# Patient Record
Sex: FEMALE | Race: WHITE | NOT HISPANIC OR LATINO | Employment: FULL TIME | URBAN - METROPOLITAN AREA
[De-identification: names, ages, dates, MRNs, and addresses within clinical notes are randomized per-mention and may not be internally consistent; named-entity substitution may affect disease eponyms.]

---

## 2022-11-03 ENCOUNTER — OFFICE VISIT (OUTPATIENT)
Dept: FAMILY MEDICINE | Facility: CLINIC | Age: 57
End: 2022-11-03
Payer: COMMERCIAL

## 2022-11-03 ENCOUNTER — ANCILLARY PROCEDURE (OUTPATIENT)
Dept: GENERAL RADIOLOGY | Facility: CLINIC | Age: 57
End: 2022-11-03
Attending: FAMILY MEDICINE
Payer: COMMERCIAL

## 2022-11-03 VITALS
HEIGHT: 66 IN | RESPIRATION RATE: 16 BRPM | HEART RATE: 75 BPM | BODY MASS INDEX: 28 KG/M2 | DIASTOLIC BLOOD PRESSURE: 80 MMHG | SYSTOLIC BLOOD PRESSURE: 118 MMHG | WEIGHT: 174.2 LBS | OXYGEN SATURATION: 96 %

## 2022-11-03 DIAGNOSIS — G89.29 CHRONIC LEFT SHOULDER PAIN: ICD-10-CM

## 2022-11-03 DIAGNOSIS — M25.512 CHRONIC LEFT SHOULDER PAIN: Primary | ICD-10-CM

## 2022-11-03 DIAGNOSIS — M75.02 ADHESIVE CAPSULITIS OF LEFT SHOULDER: ICD-10-CM

## 2022-11-03 DIAGNOSIS — M25.512 CHRONIC LEFT SHOULDER PAIN: ICD-10-CM

## 2022-11-03 DIAGNOSIS — G89.29 CHRONIC LEFT SHOULDER PAIN: Primary | ICD-10-CM

## 2022-11-03 PROCEDURE — 73030 X-RAY EXAM OF SHOULDER: CPT | Mod: TC | Performed by: RADIOLOGY

## 2022-11-03 PROCEDURE — 99203 OFFICE O/P NEW LOW 30 MIN: CPT | Performed by: FAMILY MEDICINE

## 2022-11-03 NOTE — PROGRESS NOTES
"  Assessment & Plan     (M25.512,  G89.29) Chronic left shoulder pain  (primary encounter diagnosis)  Comment: pt has left shoulder decrease ROM with mild tenderness when sleep on left side for several month. She had the same problem in the past and she did yoga  and recovered. Several month ago she over reached her dog and over stretched. Denies shooting pain and numbness and tingling to arm. Exam: left shoulder mild decreased ROM and pain on motion. No tenderness and deformity. Negative  Impingement syndrome, normal  sensation and strength. X ray is negative.   Likely she has adhesive capsulitis of left shoulder.   Plan: XR Shoulder Left G/E 3 Views, Physical Therapy         Referral        Advise rest and avoid heavy lifting. rom exercise addressed. Avoid sleep on left shoulder. Ice prn. Will have physical therapy. If symptoms worse will need shoulder mri.     (M75.02) Adhesive capsulitis of left shoulder  Comment: pt has left shoulder decreased ROM. She has adhesive capsulitis.   Plan: advise ROM exercise as tolerated. Ice and avoid heavy lifting.    956}     BMI:   Estimated body mass index is 28.12 kg/m  as calculated from the following:    Height as of this encounter: 1.676 m (5' 6\").    Weight as of this encounter: 79 kg (174 lb 3.2 oz).   Weight management plan: Discussed healthy diet and exercise guidelines        Return if symptoms worsen or fail to improve.    Elizabeth Fu MD  [facility] SISI Amado is a 56 year old, presenting for the following health issues:  Musculoskeletal Problem (Shoulders. Left shoulder been a problem X4 years. )      Musculoskeletal Problem    History of Present Illness       Reason for visit:  Shoulder injury limited range of motion  Symptom onset:  More than a month  Symptoms include:  Pain limited range of motion  Symptom intensity:  Moderate  Symptom progression:  Worsening  Had these symptoms before:  Yes  Has tried/received treatment for " "these symptoms:  No  What makes it worse:  Raising my arm or reaching behind my back  What makes it better:  No    She eats 4 or more servings of fruits and vegetables daily.She consumes 0 sweetened beverage(s) daily.She exercises with enough effort to increase her heart rate 10 to 19 minutes per day.  She exercises with enough effort to increase her heart rate 3 or less days per week.   She is taking medications regularly.          Review of Systems   Constitutional, HEENT, cardiovascular, pulmonary, gi and gu systems are negative, except as otherwise noted.      Objective    /80 (BP Location: Left arm, Patient Position: Sitting, Cuff Size: Adult Regular)   Pulse 75   Resp 16   Ht 1.676 m (5' 6\")   Wt 79 kg (174 lb 3.2 oz)   SpO2 96%   BMI 28.12 kg/m    Body mass index is 28.12 kg/m .  Physical Exam   GENERAL: healthy, alert and no distress  Shoulder exam -  Right  full range of motion, no pain on motion, no tenderness or deformity noted. Left mild decreased ROM . Moderation pain on motion. No tenderness and deformity. Normal strength, reflex and sensation of arm. Negative impiment   Cervical, thoracic and lumbar spine exam is normal without tenderness, masses or kyphoscoliosis. Full range of motion without pain is noted.                  "

## 2022-12-05 ENCOUNTER — HOSPITAL ENCOUNTER (OUTPATIENT)
Dept: PHYSICAL THERAPY | Facility: REHABILITATION | Age: 57
Discharge: HOME | End: 2022-12-05
Attending: FAMILY MEDICINE
Payer: COMMERCIAL

## 2022-12-05 DIAGNOSIS — M25.512 CHRONIC LEFT SHOULDER PAIN: ICD-10-CM

## 2022-12-05 DIAGNOSIS — M62.81 MUSCLE WEAKNESS (GENERALIZED): Primary | ICD-10-CM

## 2022-12-05 DIAGNOSIS — M25.612 DECREASED RANGE OF MOTION OF LEFT SHOULDER: ICD-10-CM

## 2022-12-05 DIAGNOSIS — G89.29 CHRONIC LEFT SHOULDER PAIN: ICD-10-CM

## 2022-12-05 PROCEDURE — 97110 THERAPEUTIC EXERCISES: CPT | Mod: GP | Performed by: PHYSICAL THERAPIST

## 2022-12-05 PROCEDURE — 97161 PT EVAL LOW COMPLEX 20 MIN: CPT | Mod: GP | Performed by: PHYSICAL THERAPIST

## 2022-12-05 NOTE — DISCHARGE INSTRUCTIONS
Exercises       X10-20 reps 1-2 sets 1-2x/day If this gets easy without fatigue you can add 1 lb or soup can      X10-20 reps 1-2 sets 1-2x/day - try to keep arms just to chest height or even lower so this is comfortable to perform      [place] and arrow stretch x5-10 reps - CAN do laying on your side or up against a wall    YOGA   Start with   Cat/cow x10-20 reps     Leaning mid range forward backward in 4 pt point (hands and knees)    Gently pushing up to downward dog and bicycling your ankles     Can work on moving through downward dog back down to hands and knees

## 2022-12-07 NOTE — ADDENDUM NOTE
Encounter addended by: Tiffanie Sawyer PT on: 12/6/2022 10:43 PM   Actions taken: Charge Capture section accepted

## 2022-12-08 NOTE — ADDENDUM NOTE
Encounter addended by: Tiffanie Sawyer, PT on: 12/8/2022 2:13 PM   Actions taken: Clinical Note Signed, Flowsheet accepted

## 2022-12-08 NOTE — PROGRESS NOTES
12/05/22 1600   General Information   Type of Visit Initial OP Ortho PT Evaluation   Start of Care Date 12/05/22   Referring Physician Elizabeth Fu MD   Patient/Family Goals Statement frozen shoulder   Orders Evaluate and Treat   Date of Order 11/03/22   Certification Required? No   Medical Diagnosis Chronic left shoulder pain (M25.512, G89.29)   Surgical/Medical history reviewed Yes   Precautions/Limitations no known precautions/limitations   General Information Comments Pt reports injuring her L collar bone from falling off of a horse years ago.   Body Part(s)   Body Part(s) Shoulder   Presentation and Etiology   Pertinent history of current problem (include personal factors and/or comorbidities that impact the POC) Pt reports having L shoulder sx for a couple of years and she was able to improve stiffness and pain pretty well with yoga for a couple of years but then about 6 months ago pt got COVID and long-haul COVID sx where she had to stop yoga due to those sx. Pt reports that she tried to return to yoga 3-4 times but she had increased pain from that so she stopped and then she had to reach and lunge for her dog at the vet and that pulled and was painful on her shoulder as well. Pt had been trying to rest it and do anti-inflammatories but that didn't help much. Pt then started doing some codmans exercises and that has felt helpful. Pt remains very limited with how well she can move her arm out to the side, overhead and behind her, is very frustrated that she can't perform yoga and perform ADLs with L UE. Pt also has increased pain with sleeping on L side disrupting her sleep quality.   Impairments A. Pain;D. Decreased ROM;E. Decreased flexibility;F. Decreased strength and endurance   Functional Limitations perform activities of daily living;perform desired leisure / sports activities   Symptom Location L shoulder   How/Where did it occur From insidious onset   Onset date of current episode/exacerbation  06/08/22   Chronicity Chronic   Pain rating (0-10 point scale) Other   Pain rating comment 0-10/10   Pain quality A. Sharp;C. Aching   Frequency of pain/symptoms C. With activity   Pain/symptoms exacerbated by G. Certain positions;H. Overhead reach;J. ADL;K. Home tasks   Progression of symptoms since onset: Improved   Fall Risk Screen   Fall screen completed by PT   Have you fallen 2 or more times in the past year? No   Have you fallen and had an injury in the past year? No   Is patient a fall risk? No   Abuse Screen (yes response referral indicated)   Feels Unsafe at Home or Work/School no   Feels Threatened by Someone no   Does Anyone Try to Keep You From Having Contact with Others or Doing Things Outside Your Home? no   Physical Signs of Abuse Present no   Functional Scales   Functional Scales Other   Other Scales  SPADI 71/130   Shoulder Objective Findings   Side (if bilateral, select both right and left) Left   Shoulder ROM Comment R shoulder ROM WNL with IR T5 with tightness   Mcneill-Konstantni Test Positive L   Shoulder Special Tests Comments Positive painful arc L   Left Shoulder Flexion AROM L flex 130 degrees with pain end range   Left Shoulder Abduction AROM L scap plane abd 148 degrees with pain end range   Left Shoulder ER AROM L ER 52 degrees with stiffness and pain   Left Shoulder IR AROM L1 with stiffness and pain   Left Shoulder Flexion Strength L 4/5 with pain   Left Shoulder Abduction Strength L 3+/5 with pain   Left Shoulder ER Strength L 4/5 without pain   Left Shoulder IR Strength L 5/5 without pain   Clinical Impression   Criteria for Skilled Therapeutic Interventions Met yes, treatment indicated   PT Diagnosis Chronic left shoulder pain with decreased ROM and strength   Clinical Presentation Stable/Uncomplicated   Clinical Decision Making (Complexity) Low complexity   Therapy Frequency 1 time/week   Predicted Duration of Therapy Intervention (days/wks) 12 weeks   Risk & Benefits of therapy have  been explained Yes   Patient, Family & other staff in agreement with plan of care Yes   Clinical Impression Comments Pt demo's signs and sx consistent with chronic L shoulder pain with positive impingement signs and limited L shoulder ROM in non-capsular pattern and rotator cuff and shoulder girdle weakness. Pt is good candidate for skilled PT services to address impairments and reach goals.   ORTHO GOALS   PT Ortho Eval Goals 1;2;3   Ortho Goal 1   Goal Description Pt will be able to sleep on L side without awakening or having sleep disrupted without L shoulder pain for improved quality of sleep in 12 weeks.   Target Date 02/26/23   Ortho Goal 2   Goal Description Pt will be able to perform yoga without L shoulder pain or difficulty for improved QOL in 12 weeks.   Target Date 02/26/23   Ortho Goal 3   Goal Description Pt will be able to use L arm for ADLs and home renovations without L shoulder pain or difficulty for improved ability to perform these tasks in 12 weeks.   Target Date 02/26/23   Total Evaluation Time   PT Mindy Low Complexity Minutes ([id number]) 26

## 2022-12-10 ENCOUNTER — HEALTH MAINTENANCE LETTER (OUTPATIENT)
Age: 57
End: 2022-12-10

## 2023-01-23 ENCOUNTER — HOSPITAL ENCOUNTER (OUTPATIENT)
Dept: PHYSICAL THERAPY | Facility: REHABILITATION | Age: 58
Discharge: HOME | End: 2023-01-23
Payer: COMMERCIAL

## 2023-01-23 DIAGNOSIS — M62.81 MUSCLE WEAKNESS (GENERALIZED): ICD-10-CM

## 2023-01-23 DIAGNOSIS — G89.29 CHRONIC LEFT SHOULDER PAIN: Primary | ICD-10-CM

## 2023-01-23 DIAGNOSIS — M25.612 DECREASED RANGE OF MOTION OF LEFT SHOULDER: ICD-10-CM

## 2023-01-23 DIAGNOSIS — M25.512 CHRONIC LEFT SHOULDER PAIN: Primary | ICD-10-CM

## 2023-01-23 PROCEDURE — 97110 THERAPEUTIC EXERCISES: CPT | Mod: GP | Performed by: PHYSICAL THERAPIST

## 2023-01-23 NOTE — DISCHARGE INSTRUCTIONS
Change laying on your side to standing band rotation     X10-20 reps 1-2 sets 1-2x/day    ADD      X10-20 reps 1-2 sets 1-2x/day    Can switch to 2 lbs with standing diagonal raises when 1 lb is easy x20 reps    ADD counter push up    X5-10 reps 1-2x/day

## 2024-01-14 ENCOUNTER — HEALTH MAINTENANCE LETTER (OUTPATIENT)
Age: 59
End: 2024-01-14

## 2024-12-22 ENCOUNTER — HEALTH MAINTENANCE LETTER (OUTPATIENT)
Age: 59
End: 2024-12-22

## 2025-01-26 ENCOUNTER — HEALTH MAINTENANCE LETTER (OUTPATIENT)
Age: 60
End: 2025-01-26

## 2025-07-06 ENCOUNTER — OFFICE VISIT (OUTPATIENT)
Dept: URGENT CARE | Facility: URGENT CARE | Age: 60
End: 2025-07-06
Payer: COMMERCIAL

## 2025-07-06 VITALS
BODY MASS INDEX: 27.92 KG/M2 | WEIGHT: 173 LBS | OXYGEN SATURATION: 97 % | SYSTOLIC BLOOD PRESSURE: 135 MMHG | RESPIRATION RATE: 16 BRPM | HEART RATE: 96 BPM | DIASTOLIC BLOOD PRESSURE: 82 MMHG | TEMPERATURE: 98.7 F
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
## 2025-07-06 DIAGNOSIS — S91.059A CAT BITE OF ANKLE, INITIAL ENCOUNTER: Primary | ICD-10-CM

## 2025-07-06 PROCEDURE — 90471 IMMUNIZATION ADMIN: CPT | Performed by: FAMILY MEDICINE

## 2025-07-06 PROCEDURE — 90715 TDAP VACCINE 7 YRS/> IM: CPT | Performed by: FAMILY MEDICINE

## 2025-07-06 PROCEDURE — 3075F SYST BP GE 130 - 139MM HG: CPT | Performed by: FAMILY MEDICINE

## 2025-07-06 PROCEDURE — 3079F DIAST BP 80-89 MM HG: CPT | Performed by: FAMILY MEDICINE

## 2025-07-06 PROCEDURE — 99213 OFFICE O/P EST LOW 20 MIN: CPT | Mod: 25 | Performed by: FAMILY MEDICINE

## 2025-07-06 RX ORDER — SULFAMETHOXAZOLE AND TRIMETHOPRIM 800; 160 MG/1; MG/1
1 TABLET ORAL 2 TIMES DAILY
Qty: 20 TABLET | Refills: 0 | Status: SHIPPED | OUTPATIENT
Start: 2025-07-06 | End: 2025-07-16

## 2025-07-06 RX ORDER — METRONIDAZOLE 500 MG/1
500 TABLET ORAL 3 TIMES DAILY
Qty: 30 TABLET | Refills: 0 | Status: SHIPPED | OUTPATIENT
Start: 2025-07-06 | End: 2025-07-16

## 2025-07-07 NOTE — PROGRESS NOTES
Assessment & Plan     Cat bite of ankle, initial encounter  Bactrim/flagyl as allergy to PCN  Tdap up date  - sulfamethoxazole-trimethoprim (BACTRIM DS) 800-160 MG tablet  Dispense: 20 tablet; Refill: 0  - metroNIDAZOLE (FLAGYL) 500 MG tablet  Dispense: 30 tablet; Refill: 0             No follow-ups on file.    Casey Hnog MD  [facility] ABBY Amado is a 59 year old female who presents to clinic today for the following health issues:  Chief Complaint   Patient presents with    Trauma     Both lower legs           7/6/2025     7:30 PM   Additional Questions   Roomed by Crystal   Accompanied by self         7/6/2025     7:30 PM   Patient Reported Additional Medications   Patient reports taking the following new medications none     HPI    Here with cat bite  Legs both sides  Bled quite bit        Review of Systems        Objective    /82 (BP Location: Right arm, Patient Position: Sitting, Cuff Size: Adult Large)   Pulse 96   Temp 98.7  F (37.1  C) (Oral)   Resp 16   Wt 78.5 kg (173 lb)   SpO2 97%   BMI 27.92 kg/m    Physical Exam  Vitals and nursing note reviewed.   Constitutional:       Appearance: Normal appearance.   Skin:     Comments: Bites consistent with puncture wound of legs and abrasion   Neurological:      Mental Status: She is alert.

## 2025-07-07 NOTE — PROGRESS NOTES
Urgent Care Clinic Visit    Chief Complaint   Patient presents with    Trauma     Both lower legs                 7/6/2025     7:30 PM   Additional Questions   Roomed by Crystal   Accompanied by self         7/6/2025     7:30 PM   Patient Reported Additional Medications   Patient reports taking the following new medications none

## 2025-07-27 ENCOUNTER — HEALTH MAINTENANCE LETTER (OUTPATIENT)
Age: 60
End: 2025-07-27